# Patient Record
Sex: MALE | Race: WHITE | HISPANIC OR LATINO | URBAN - METROPOLITAN AREA
[De-identification: names, ages, dates, MRNs, and addresses within clinical notes are randomized per-mention and may not be internally consistent; named-entity substitution may affect disease eponyms.]

---

## 2023-02-27 ENCOUNTER — EVALUATION (OUTPATIENT)
Dept: PHYSICAL THERAPY | Facility: CLINIC | Age: 38
End: 2023-02-27

## 2023-02-27 DIAGNOSIS — M54.41 LOW BACK PAIN WITH RIGHT-SIDED SCIATICA, UNSPECIFIED BACK PAIN LATERALITY, UNSPECIFIED CHRONICITY: Primary | ICD-10-CM

## 2023-02-27 NOTE — LETTER
2023    Vivien Stauffer, [address] [address]    Patient: Mark Ogluin   YOB: 1985   Date of Visit: 2023     Encounter Diagnosis     ICD-10-CM    1  Low back pain with right-sided sciatica, unspecified back pain laterality, unspecified chronicity  M54 41           Dear Dr Adarsh Valentine: Thank you for your recent referral of Mark Olguin  Please review the attached evaluation summary from [facility] recent visit  Please verify that you agree with the plan of care by signing the attached order  If you have any questions or concerns, please do not hesitate to call  I sincerely appreciate the opportunity to share in the care of one of your patients and hope to have another opportunity to work with you in the near future  Sincerely,    Evi Justice, PT      Referring Provider:      I certify that I have read the below Plan of Care and certify the need for these services furnished under this plan of treatment while under my care  Vivien Stauffer MD  [address] [address]  Via Fax: [phone]          PT Evaluation     Today's date: 2023  Patient name: Mark Olguin  : 1985  MRN: [id number]  Referring provider: Farheen Velez MD  Dx:   Encounter Diagnosis     ICD-10-CM    1  Low back pain with right-sided sciatica, unspecified back pain laterality, unspecified chronicity  M54 41                      Assessment  Assessment details: Mark Olguin is a 44 y o  male referred with primary diagnosis of Low back pain with right-sided sciatica, unspecified back pain laterality, unspecified chronicity  (primary encounter diagnosis)   Patient presents with the following functional limitations: Pain with standing/walking > 10 minutes, sitting more than 20 minutes, bending over, and carrying heavier objects  Patient's symptoms are consistent with possible disc derangement    Pain in back and legs was abolished with repeated extension in prone position  Treatment to include: Manual therapy techniques, extremity/core strengthening, neuromuscular control exercises, instruction in a comprehensive HEP, and modalities as needed  They will benefit from skilled PT services to address the above functional deficits and to decrease pain to promote a return to their premorbid level of function  Impairments: activity intolerance, impaired physical strength, lacks appropriate home exercise program and pain with function  Functional limitations: Pain with standing/walking > 10 minutes, sitting more than 20 minutes, bending over, and carrying heavier objects  Understanding of Dx/Px/POC: good   Prognosis: good    Goals  STG (4 weeks)  1  Patient will report pain as a 4-5/10 at worst with normal activity  2  Patient will report 50% reduction in sleep disturbances related to pain  LTG (8 weeks)  1  Patient will report pain as a 2-3/10 at worst with normal activities  2  Patient will report the ability to ambulate community distances without increased pain  3  Patient will demonstrate the ability tto independently correct his posture   4  Patient will be independent and compliant with a HEP in order to maintain gains made with skilled PT services      Plan  Patient would benefit from: skilled physical therapy  Planned modality interventions: thermotherapy: hydrocollator packs and electrical stimulation/Russian stimulation  Planned therapy interventions: manual therapy, joint mobilization, abdominal trunk stabilization, neuromuscular re-education, patient education, postural training, strengthening, stretching, therapeutic activities, therapeutic exercise and home exercise program  Frequency: 2x week  Duration in weeks: 8  Plan of Care beginning date: 2/27/2023  Plan of Care expiration date: 4/24/2023  Treatment plan discussed with: patient        Subjective Evaluation    History of Present Illness  Mechanism of injury: Patient reports he has been having recurrent lower back pain for the past 10 years  About a month ago he was carrying a speaker and slipped on ice and twisted his back  For 3 days he couldn't move around very much  Had a cortisone injection in November with relief of symptoms for 30 days  Pain has returned to his baseline levels, but it effects him throughout the day  Referred now to outpatient PT services  Recurrent probem    Quality of life: good    Pain  Current pain ratin  At best pain rating: 3  At worst pain rating: 10  Location: right lower back, right buttock and right posterior thigh to knee  Relieving factors: heat  Progression: worsening    Social Support  Lives with: spouse and young children    Exercise history: Exercises 4x/week  Cardio, calisthenics and weight lifting  Diagnostic Tests  MRI studies: abnormal    FCE comments: (+) sleep disturbances 2x/night  Denies saddle anesthesia, weakness into bilateral LE  Feels better standing and walking  Patient states MRI shows he has a bulging disc in his lower back  One surgeon suggested he have surgery  Has previously done PT and chiropractor with symptom relief  He continues to do his HEP which he says helps  He has an inversion table at home which provides good symptom relief  Is unable to bench press and do sit ups due to pain  Treatments  Previous treatment: physical therapy, massage and chiropractic  Current treatment: massage and speech therapy  Patient Goals  Patient goals for therapy: decreased pain  Patient goal: Become independent with pain management  Objective     Concurrent Complaints  Positive for disturbed sleep  Negative for night pain, bladder dysfunction, bowel dysfunction, saddle (S4) numbness, history of cancer and history of trauma    Static Posture     Lumbar Spine   Flattened       Postural Observations  Seated posture: fair  Standing posture: fair  Correction of posture: makes symptoms better        Palpation   Left   No palpable tenderness to the erector spinae, lumbar paraspinals and quadratus lumborum  Right   No palpable tenderness to the erector spinae, lumbar paraspinals and quadratus lumborum  Neurological Testing     Sensation     Lumbar   Left   Intact: light touch    Right   Intact: light touch    Reflexes   Left   Patellar (L4): normal (2+)  Achilles (S1): normal (2+)    Right   Patellar (L4): normal (2+)  Achilles (S1): normal (2+)    Active Range of Motion     Lumbar   Flexion:  with pain  Extension:  WFL  Left lateral flexion:  WFL  Right lateral flexion:  WFL  Left rotation:  WFL  [facility]  [facility]    Joint Play     Hypomobile: L3, L4 and L5     Pain: L3, L4 and L5   Mechanical Assessment    Cervical      Thoracic      Lumbar    Standing extension: repeated movements  Pain location: centralized  Pain intensity: better  Pain level: decreased  Lying extension: repeated movements  Pain location: centralized  Pain intensity: better  Pain level: abolished    Strength/Myotome Testing     Lumbar   Left   Normal strength    Right   Normal strength    Tests     Lumbar   Negative SIJ compression, sacroiliac distraction and sacral spring   Left   Negative crossed SLR, passive SLR and quadrant  Right   Positive passive SLR and slump test    Negative quadrant  Left Hip   Negative ANITRA  Right Hip   Negative ANITRA and FADIR  Additional Tests Details  Increased neural tension right sciatic nerve    Ambulation     Ambulation: Stairs   Ascend stairs: independent  Pattern: reciprocal  Railings: without rails  Descend stairs: independent  Pattern: reciprocal  Railings: without rails    Observational Gait   Gait: within functional limits   Walking speed and stride length within functional limits               Precautions: None      Manuals 2/27            Right UPA mobs at facet joint Grade III JF            Prone lumbar rocking mobs NV            Left SL lumbar rotation mobs             Right sciatic nerve glides JF            Neuro Re-Ed             Pt education Posture    Centralization versus peripheralization            Webslide rows M,L             T-band Paloff Press             PPT with March L1 NV            Single leg bridge             SL t-band clamshell                          Ther Ex             UBE 1/2 F/R             Step HS stretch stacie             CALOS x10            Prone press-ups 2x10            Seated right nerve glides                                                    Ther Activity                                       Gait Training                                       Modalities

## 2023-02-27 NOTE — PROGRESS NOTES
PT Evaluation     Today's date: 2023  Patient name: Jose L Vidales  : 1985  MRN: [id number]  Referring provider: Vishal Pyle MD  Dx:   Encounter Diagnosis     ICD-10-CM    1  Low back pain with right-sided sciatica, unspecified back pain laterality, unspecified chronicity  M54 41                      Assessment  Assessment details: Jose L Vidales is a 44 y o  male referred with primary diagnosis of Low back pain with right-sided sciatica, unspecified back pain laterality, unspecified chronicity  (primary encounter diagnosis)   Patient presents with the following functional limitations: Pain with standing/walking > 10 minutes, sitting more than 20 minutes, bending over, and carrying heavier objects  Patient's symptoms are consistent with possible disc derangement  Pain in back and legs was abolished with repeated extension in prone position  Treatment to include: Manual therapy techniques, extremity/core strengthening, neuromuscular control exercises, instruction in a comprehensive HEP, and modalities as needed  They will benefit from skilled PT services to address the above functional deficits and to decrease pain to promote a return to their premorbid level of function  Impairments: activity intolerance, impaired physical strength, lacks appropriate home exercise program and pain with function  Functional limitations: Pain with standing/walking > 10 minutes, sitting more than 20 minutes, bending over, and carrying heavier objects  Understanding of Dx/Px/POC: good   Prognosis: good    Goals  STG (4 weeks)  1  Patient will report pain as a 4-5/10 at worst with normal activity  2  Patient will report 50% reduction in sleep disturbances related to pain  LTG (8 weeks)  1  Patient will report pain as a 2-3/10 at worst with normal activities  2  Patient will report the ability to ambulate community distances without increased pain  3   Patient will demonstrate the ability tto independently correct his posture   4  Patient will be independent and compliant with a HEP in order to maintain gains made with skilled PT services  Plan  Patient would benefit from: skilled physical therapy  Planned modality interventions: thermotherapy: hydrocollator packs and electrical stimulation/Russian stimulation  Planned therapy interventions: manual therapy, joint mobilization, abdominal trunk stabilization, neuromuscular re-education, patient education, postural training, strengthening, stretching, therapeutic activities, therapeutic exercise and home exercise program  Frequency: 2x week  Duration in weeks: 8  Plan of Care beginning date: 2023  Plan of Care expiration date: 2023  Treatment plan discussed with: patient        Subjective Evaluation    History of Present Illness  Mechanism of injury: Patient reports he has been having recurrent lower back pain for the past 10 years  About a month ago he was carrying a speaker and slipped on ice and twisted his back  For 3 days he couldn't move around very much  Had a cortisone injection in November with relief of symptoms for 30 days  Pain has returned to his baseline levels, but it effects him throughout the day  Referred now to outpatient PT services  Recurrent probem    Quality of life: good    Pain  Current pain ratin  At best pain rating: 3  At worst pain rating: 10  Location: right lower back, right buttock and right posterior thigh to knee  Relieving factors: heat  Progression: worsening    Social Support  Lives with: spouse and young children    Exercise history: Exercises 4x/week  Cardio, calisthenics and weight lifting  Diagnostic Tests  MRI studies: abnormal    FCE comments: (+) sleep disturbances 2x/night  Denies saddle anesthesia, weakness into bilateral LE  Feels better standing and walking  Patient states MRI shows he has a bulging disc in his lower back  One surgeon suggested he have surgery    Has previously done PT and chiropractor with symptom relief  He continues to do his HEP which he says helps  He has an inversion table at home which provides good symptom relief  Is unable to bench press and do sit ups due to pain  Treatments  Previous treatment: physical therapy, massage and chiropractic  Current treatment: massage and speech therapy  Patient Goals  Patient goals for therapy: decreased pain  Patient goal: Become independent with pain management  Objective     Concurrent Complaints  Positive for disturbed sleep  Negative for night pain, bladder dysfunction, bowel dysfunction, saddle (S4) numbness, history of cancer and history of trauma    Static Posture     Lumbar Spine   Flattened  Postural Observations  Seated posture: fair  Standing posture: fair  Correction of posture: makes symptoms better        Palpation   Left   No palpable tenderness to the erector spinae, lumbar paraspinals and quadratus lumborum  Right   No palpable tenderness to the erector spinae, lumbar paraspinals and quadratus lumborum       Neurological Testing     Sensation     Lumbar   Left   Intact: light touch    Right   Intact: light touch    Reflexes   Left   Patellar (L4): normal (2+)  Achilles (S1): normal (2+)    Right   Patellar (L4): normal (2+)  Achilles (S1): normal (2+)    Active Range of Motion     Lumbar   Flexion:  with pain  Extension:  WFL  Left lateral flexion:  WFL  Right lateral flexion:  WFL  Left rotation:  WFL  Right rotation:  [facility]    Joint Play     Hypomobile: L3, L4 and L5     Pain: L3, L4 and L5   Mechanical Assessment    Cervical      Thoracic      Lumbar    Standing extension: repeated movements  Pain location: centralized  Pain intensity: better  Pain level: decreased  Lying extension: repeated movements  Pain location: centralized  Pain intensity: better  Pain level: abolished    Strength/Myotome Testing     Lumbar   Left   Normal strength    Right   Normal strength    Tests     Lumbar   Negative SIJ compression, sacroiliac distraction and sacral spring   Left   Negative crossed SLR, passive SLR and quadrant  Right   Positive passive SLR and slump test    Negative quadrant  Left Hip   Negative ANITRA  Right Hip   Negative ANITRA and FADIR  Additional Tests Details  Increased neural tension right sciatic nerve    Ambulation     Ambulation: Stairs   Ascend stairs: independent  Pattern: reciprocal  Railings: without rails  Descend stairs: independent  Pattern: reciprocal  Railings: without rails    Observational Gait   Gait: within functional limits   Walking speed and stride length within functional limits  Precautions: None      Manuals 2/27            Right UPA mobs at facet joint Grade III JF            Prone lumbar rocking mobs NV            Left SL lumbar rotation mobs             Right sciatic nerve glides JF            Neuro Re-Ed             Pt education Posture    Centralization versus peripheralization            Webslide rows M,L             T-band Paloff Press             PPT with March L1 NV            Single leg bridge             SL t-band clamshell                          Ther Ex             UBE 1/2 F/R             Step HS stretch stacie             CALOS x10            Prone press-ups 2x10            Seated right nerve glides                                                    Ther Activity                                       Gait Training                                       Modalities

## 2023-03-02 ENCOUNTER — OFFICE VISIT (OUTPATIENT)
Dept: PHYSICAL THERAPY | Facility: CLINIC | Age: 38
End: 2023-03-02

## 2023-03-02 DIAGNOSIS — M54.41 LOW BACK PAIN WITH RIGHT-SIDED SCIATICA, UNSPECIFIED BACK PAIN LATERALITY, UNSPECIFIED CHRONICITY: Primary | ICD-10-CM

## 2023-03-02 NOTE — PROGRESS NOTES
Daily Note     Today's date: 3/2/2023  Patient name: Moises Fan  : 1985  MRN: [id number]  Referring provider: Андрей Hunter MD  Dx:   Encounter Diagnosis     ICD-10-CM    1  Low back pain with right-sided sciatica, unspecified back pain laterality, unspecified chronicity  M54 41           Start Time: 1030  Stop Time: 1115  Total time in clinic (min): 45 minutes    Subjective: pt noted upon arrival having some LB p! But noted he has been able to manage it  Pt noted also having some knee and ankle p!        Objective: See treatment diary below      Assessment: Continued with treatment session,  Required some verbal cues for proper technique as needed t/o session  + results with lock and sag prone press ups  Tolerated treatment well  Patient exhibited good technique with therapeutic exercises and would benefit from continued PT  S/P treatment session, pt noted feeling some muscle soreness  Education:   - HEP - education to continue with compliance  - DOMS - 24 to 48 hours of soreness s/p treatment may be noted  -Advised may use modalities as needed  MH: 10-20 min on and at least 60 min off prior to reapplication  Advised to not sleep with on as well  Plan: Continue per plan of care  Precautions: None      Manuals  3           Right UPA mobs at facet joint Grade III JF            Prone lumbar rocking mobs NV            Left SL lumbar rotation mobs             Right sciatic nerve glides JF resume NV missed  Neuro Re-Ed             Pt education Posture  Centralization versus peripheralization            Webslide rows M,L  BTB 2x 10            T-band Paloff Press  NV           PPT with March L1 NV Up up down down 2x 10            Single leg bridge  10x ea              SL t-band clamshell                          Ther Ex             UBE 1/2 F/R  5'/5' LV 2          Step HS stretch stacie             CALOS x10 reviewed            Prone press-ups 2x10 10x            Prone press up with lock and sag   10x            Seated right nerve glides                                                    Ther Activity                                       Gait Training                                       Modalities

## 2023-03-06 ENCOUNTER — OFFICE VISIT (OUTPATIENT)
Dept: PHYSICAL THERAPY | Facility: CLINIC | Age: 38
End: 2023-03-06

## 2023-03-06 DIAGNOSIS — M54.41 LOW BACK PAIN WITH RIGHT-SIDED SCIATICA, UNSPECIFIED BACK PAIN LATERALITY, UNSPECIFIED CHRONICITY: Primary | ICD-10-CM

## 2023-03-06 NOTE — PROGRESS NOTES
Daily Note     Today's date: 3/6/2023  Patient name: John Stephen  : 1985  MRN: [id number]  Referring provider: Annie Nicole MD  Dx:   Encounter Diagnosis     ICD-10-CM    1  Low back pain with right-sided sciatica, unspecified back pain laterality, unspecified chronicity  M54 41           Start Time: 1023  Stop Time: 1112  Total time in clinic (min): 49 minutes    Subjective: Pt noted having a little bit of soreness s/p treatment session  But noted overall feeling pretty good  Pt noted minimal p! 2/10 in his R side of his LB with radicular symptom to mid posterior thigh  Pt noted that he did a [employer] race this weekend  3k  "tough mudder"  Objective: See treatment diary below      Assessment: Continued with treatment session, Tolerated treatment well  Pt noted having some muscle burring with s/l clamshells  Patient exhibited good technique with therapeutic exercises and would benefit from continued PT  Pt noted that symptoms were centralized in lumbar s/p 1st set of 10 of prone lock and sag  S/P treatment session pt noted having minimal tightness in his lower back but no radicular symptoms present  Education:   - DOMS - 24 to 48 hours of muscle soreness s/p progressions added this visit  -HEP - compliance  Additional exercises added and received an updated TB for continuation as well  Plan: Continue per plan of care  Precautions: None      Manuals 2/27 3/2 3/6           Right UPA mobs at facet joint Grade III JF            Prone lumbar rocking mobs NV            Left SL lumbar rotation mobs             Right sciatic nerve glides JF resume NV missed  Neuro Re-Ed             Pt education Posture    Centralization versus peripheralization            Webslide rows M,L  BTB 2x 10  Black 3x 10  de NV         T-band [employer]  NV Black 10"x 10           PPT with March L1 NV Up up down down 2x 10            Single leg bridge 10x ea    2x 10           SL t-band clamshell   2x 10 GTB                        Ther Ex             UBE 1/2 F/R  5'/5' LV 7 5'/5' pt request            Step HS stretch stacie   30" x 3           CALOS x10 reviewed            Prone press-ups 2x10 10x            Prone press up with lock and sag   10x  2x 10           Seated right nerve glides                                                    Ther Activity                                       Gait Training                                       Modalities

## 2023-03-09 ENCOUNTER — OFFICE VISIT (OUTPATIENT)
Dept: PHYSICAL THERAPY | Facility: CLINIC | Age: 38
End: 2023-03-09

## 2023-03-09 DIAGNOSIS — M54.41 LOW BACK PAIN WITH RIGHT-SIDED SCIATICA, UNSPECIFIED BACK PAIN LATERALITY, UNSPECIFIED CHRONICITY: Primary | ICD-10-CM

## 2023-03-09 NOTE — PROGRESS NOTES
Daily Note     Today's date: 3/9/2023  Patient name: Vito Mera  : 1985  MRN: [id number]  Referring provider: Khang Cartagena MD  Dx:   Encounter Diagnosis     ICD-10-CM    1  Low back pain with right-sided sciatica, unspecified back pain laterality, unspecified chronicity  M54 41                      Subjective: Patient states he has been feeling very good and is not having any back pain  Has muscle soreness which he attributes to working out and doing his HEP, but he is not having pain  Objective: See treatment diary below      Assessment: Tolerated treatment well  Patient would benefit from continued PT  Good technique with TE today  Fatigued after session  Core strength improved  Progressing very well  Plan: Continue per plan of care  Precautions: None      Manuals 2/27 3/2 3/6  3/9         Right UPA mobs at facet joint Grade III JF            Prone lumbar rocking mobs NV            Left SL lumbar rotation mobs             Right sciatic nerve glides JF resume NV missed  Neuro Re-Ed             Pt education Posture  Centralization versus peripheralization            Webslide rows M,L  BTB 2x 10  Black 3x 10  [id number] 3x10         Rochelle chops    25# 3x10         [place] Reverse chops    15# 3x10         T-band Paloff Press  NV Black 10"x 10           PPT with March L1 NV Up up down down 2x 10            T-ball Bridge    5"x30         Single leg bridge  10x ea    2x 10           SL t-band clamshell   2x 10 GTB           Prone planks    3x30"         Side planks    x90"         Ther Ex             UBE  F/R  5'/5' LV 7 5'/5' pt request   LV 7 5'/5' pt request           Step HS stretch stacie   30" x 3           CALOS x10 reviewed            Prone press-ups 2x10 10x            Prone press up with lock and sag   10x  2x 10           Seated right nerve glides                                                    Ther Activity Gait Training                                       Modalities

## 2023-03-13 ENCOUNTER — OFFICE VISIT (OUTPATIENT)
Dept: PHYSICAL THERAPY | Facility: CLINIC | Age: 38
End: 2023-03-13

## 2023-03-13 DIAGNOSIS — M54.41 LOW BACK PAIN WITH RIGHT-SIDED SCIATICA, UNSPECIFIED BACK PAIN LATERALITY, UNSPECIFIED CHRONICITY: Primary | ICD-10-CM

## 2023-03-13 NOTE — PROGRESS NOTES
Daily Note     Today's date: 3/13/2023  Patient name: Bernie Kidd  : 1985  MRN: [id number]  Referring provider: Robbi Mcginnis MD  Dx:   Encounter Diagnosis     ICD-10-CM    1  Low back pain with right-sided sciatica, unspecified back pain laterality, unspecified chronicity  M54 41                      Subjective: Back continues to feel better  Was at a wrestling tournament this weekend and had to get up and move around when he started to feel sore  Objective: See treatment diary below      Assessment: Tolerated treatment well  Patient would benefit from continued PT  Patient states his back is feeling good  No complaints of back pain  Had some soreness in ankle and knee with sidestepping today  Plan: Continue per plan of care  Precautions: None      Manuals 2/27 3/2 3/6  3/9 3/13        Right UPA mobs at facet joint Grade III JF            Prone lumbar rocking mobs NV            Left SL lumbar rotation mobs             Right sciatic nerve glides JF resume NV missed  Neuro Re-Ed             Pt education Posture  Centralization versus peripheralization            Webslide rows M,L  BTB 2x 10  Black 3x 10  [id number]# 3x10 cplqvx86# 3x10        [place] chops    25# 3x10 25# 3x10        Rochelle Reverse chops    15# 3x10 15# 3x10        Rochelle Multifidus sidestepping     10#x10 ea        T-band Paloff Press  NV Black 10"x 10           PPT with March L1 NV Up up down down 2x 10            T-ball Bridge    5"x30 5"x30        Single leg bridge  10x ea    2x 10           SL t-band clamshell   2x 10 GTB           Prone planks    3x30"         Side planks    x90"         Ther Ex             UBE  F/R  5'/5' LV 7 5'/5' pt request   LV 7 5'/5' pt request   LV 5 5'/5'         Step HS stretch stacie   30" x 3           CALOS x10 reviewed            Prone press-ups 2x10 10x            Prone press up with lock and sag   10x  2x 10           Seated right nerve glides                                                    Ther Activity                                       Gait Training                                       Modalities

## 2023-03-15 NOTE — PROGRESS NOTES
"Daily Note     Today's date: 3/15/2023  Patient name: Devendra Stuart  : 1985  MRN: [id number]  Referring provider: Jamir Mackey MD  Dx:   Encounter Diagnosis     ICD-10-CM    1  Low back pain with right-sided sciatica, unspecified back pain laterality, unspecified chronicity  M54 41                      Subjective: ***      Objective: See treatment diary below      Assessment: Tolerated treatment {Tolerated treatment :4647898948}  Patient {assessment:3178259300}      Plan: {PLAN:7369320091}     Precautions: None      Manuals 2/27 3/2 3/6  3/9 3/13        Right UPA mobs at facet joint Grade III JF            Prone lumbar rocking mobs NV            Left SL lumbar rotation mobs             Right sciatic nerve glides JF resume NV missed  Neuro Re-Ed             Pt education Posture  Centralization versus peripheralization            Webslide rows M,L  BTB 2x 10  Black 3x 10  [id number]# 3x10 [id number]# 3x10        Rochelle chops    25# 3x10 25# 3x10        Rochelle Reverse chops    15# 3x10 15# 3x10        Rochelle Multifidus sidestepping     10#x10 ea        T-band Paloff Press  NV Black 10\"x 10           PPT with March L1 NV Up up down down 2x 10            T-ball Bridge    5\"x30 5\"x30        Single leg bridge  10x ea    2x 10           SL t-band clamshell   2x 10 GTB           Prone planks    3x30\"         Side planks    x90\"         Ther Ex             UBE / F/R  5'/5' LV 7 5'/5' pt request   LV 7 5'/5' pt request   LV 5 5'/5'         Step HS stretch stcaie   30\" x 3           CALOS x10 reviewed            Prone press-ups 2x10 10x            Prone press up with lock and sag   10x  2x 10           Seated right nerve glides                                                    Ther Activity                                       Gait Training                                       Modalities                                            "

## 2023-03-16 ENCOUNTER — APPOINTMENT (OUTPATIENT)
Dept: PHYSICAL THERAPY | Facility: CLINIC | Age: 38
End: 2023-03-16

## 2023-03-16 DIAGNOSIS — M54.41 LOW BACK PAIN WITH RIGHT-SIDED SCIATICA, UNSPECIFIED BACK PAIN LATERALITY, UNSPECIFIED CHRONICITY: Primary | ICD-10-CM

## 2023-03-20 ENCOUNTER — OFFICE VISIT (OUTPATIENT)
Dept: PHYSICAL THERAPY | Facility: CLINIC | Age: 38
End: 2023-03-20

## 2023-03-20 DIAGNOSIS — M54.41 LOW BACK PAIN WITH RIGHT-SIDED SCIATICA, UNSPECIFIED BACK PAIN LATERALITY, UNSPECIFIED CHRONICITY: Primary | ICD-10-CM

## 2023-03-20 NOTE — PROGRESS NOTES
Daily Note     Today's date: 3/20/2023  Patient name: Lin Watt  : 1985  MRN: [id number]  Referring provider: Smita Meadows MD  Dx:   Encounter Diagnosis     ICD-10-CM    1  Low back pain with right-sided sciatica, unspecified back pain laterality, unspecified chronicity  M54 41                      Subjective: Patient states he was back and forth to [place] for work this weekend and is having pain in his lower back  Trying doing his exercises as he had a chance  Objective: See treatment diary below      Assessment: Tolerated treatment well  Patient would benefit from continued PT  Pain in lower back was abolished with press-ups with overpressure today  Held other treatment due to patient feeling well and wanting to go to work  Plan: Continue per plan of care  Precautions: None      Manuals 2/27 3/2 3/6  3/9 3/13 3/20       Right UPA mobs at facet joint Grade III JF     Grade III JF       Prone lumbar rocking mobs NV     JF       Left SL lumbar rotation mobs             Right sciatic nerve glides JF resume NV missed  Neuro Re-Ed             Pt education Posture  Centralization versus peripheralization            Webslide rows M,L  BTB 2x 10  Black 3x 10  [id number]# 3x10 [id number]# 3x10        Rochelle chops    25# 3x10 25# 3x10        [place] Reverse chops    15# 3x10 15# 3x10        Rochelle Multifidus sidestepping     10#x10 ea        T-band Paloff Press  NV Black 10"x 10           PPT with March L1 NV Up up down down 2x 10            T-ball Bridge    5"x30 5"x30        Single leg bridge  10x ea    2x 10           SL t-band clamshell   2x 10 GTB           Prone planks    3x30"         Side planks    x90"         Ther Ex             UBE /2 F/R  5'/5' LV 7 5'/5' pt request   LV 7 5'/5' pt request   LV 5 5'/5'         Nustep seat 10 with UE      L8x10'       Step HS stretch stacie   30" x 3           CALOS x10 reviewed            Prone press-ups 2x10 10x     PT OP 5x10       Prone press up with lock and sag   10x  2x 10           Seated right nerve glides                                                    Ther Activity                                       Gait Training                                       Modalities

## 2023-03-23 ENCOUNTER — OFFICE VISIT (OUTPATIENT)
Dept: PHYSICAL THERAPY | Facility: CLINIC | Age: 38
End: 2023-03-23

## 2023-03-23 DIAGNOSIS — M54.41 LOW BACK PAIN WITH RIGHT-SIDED SCIATICA, UNSPECIFIED BACK PAIN LATERALITY, UNSPECIFIED CHRONICITY: Primary | ICD-10-CM

## 2023-03-23 NOTE — PROGRESS NOTES
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      Plan: Continue per plan of care  Precautions: None      Manuals 2/27 3/2 3/6  3/9 3/13 3/20 3/23      Right UPA mobs at facet joint Grade III JF     Grade III JF       Prone lumbar rocking mobs NV     JF       Left SL lumbar rotation mobs             Right sciatic nerve glides JF resume NV missed  Neuro Re-Ed             Pt education Posture    Centralization versus peripheralization            Webslide rows M,L  BTB 2x 10  Black 3x 10  [id number]# 3x10 [id number]# 3x10  Rochelle 25# L 30# M  3x 10       [place] chops    25# 3x10 25# 3x10  15# 3x 10       Rochelle Reverse chops    15# 3x10 15# 3x10  15# 3x 10       Rochelle Multifidus sidestepping     10#x10 ea        T-band Paloff Press  NV Black 10"x 10     kesier 12# 10x e a       PPT with March L1 NV Up up down down 2x 10            T-ball Bridge 5"x30 5"x30        Single leg bridge  10x ea  2x 10           SL t-band clamshell   2x 10 GTB           Prone planks    3x30"         Side planks    x90"         Ther Ex             UBE 1/2 F/R  5'/5' LV 7 5'/5' pt request   LV 7 5'/5' pt request   LV 5 5'/5'   L5 - L8 10 min       Nustep seat 10 with UE      L8x10'       Step HS stretch stacie   30" x 3           CALOS x10 reviewed            Prone press-ups 2x10 10x     PT OP 5x10       Prone press up with lock and sag   10x  2x 10           Seated right nerve glides                                                    Ther Activity                                       Gait Training                                       Modalities                                              1 on 1 time for 19 minutes on 3/23/23 all other exercises completed with indirect supervision

## 2023-03-30 ENCOUNTER — OFFICE VISIT (OUTPATIENT)
Dept: PHYSICAL THERAPY | Facility: CLINIC | Age: 38
End: 2023-03-30

## 2023-03-30 DIAGNOSIS — M54.41 LOW BACK PAIN WITH RIGHT-SIDED SCIATICA, UNSPECIFIED BACK PAIN LATERALITY, UNSPECIFIED CHRONICITY: Primary | ICD-10-CM

## 2023-03-30 NOTE — PROGRESS NOTES
"Daily Note     Today's date: 3/30/2023  Patient name: Marlin Melgoza  : 1985  MRN: [id number]  Referring provider: Nydia Valdes MD  Dx:   Encounter Diagnosis     ICD-10-CM    1  Low back pain with right-sided sciatica, unspecified back pain laterality, unspecified chronicity  M54 41                      Subjective: Patient states back is feeling pretty good  Had some soreness due to flying and sitting in the airport  Also pitched batting practice yesterday at practice and his back was sore  Objective: See treatment diary below      Assessment: Tolerated treatment well  Patient would benefit from continued PT  Felt very good after session  Manual techniques abolished pain  Plan: Continue per plan of care  Precautions: None      Manuals 2/27 3/2 3/6  3/9 3/13 3/20 3/23 3/30     Right UPA mobs at facet joint Grade III JF     Grade III JF       Prone lumbar rocking mobs NV     JF       Left SL lumbar rotation mobs             Right sciatic nerve glides JF resume NV missed  CPA mobs L3-5        Grade III-IV JF                               Neuro Re-Ed             Pt education Posture  Centralization versus peripheralization            Webslide rows M,L  BTB 2x 10  Black 3x 10  [id number]# 3x10 [id number]# 3x10  [place] 25# L 30# M  3x 10  Rochelle 25# L 30# M  3x 10      Rochelle chops    25# 3x10 25# 3x10  15# 3x 10  15# 3x 10      [place] Reverse chops    15# 3x10 15# 3x10  15# 3x 10  15# 3x 10      [place] Multifidus sidestepping     10#x10 ea        T-band Paloff Press  NV Black 10\"x 10     kesier 12# 10x e a       PPT with March L1 NV Up up down down 2x 10            T-ball Bridge    5\"x30 5\"x30        Single leg bridge  10x ea    2x 10           SL t-band clamshell   2x 10 GTB           Prone planks    3x30\"         Side planks    x90\"         Ther Ex             UBE  F/R  5'/5' LV 7 5'/5' pt request   LV 7 5'/5' pt request   LV 5 5'/5'   L5 - L8 10 min  LV 7 5'/5' pt requ   " "  Nustep seat 10 with UE      L8x10'  L8x10'     Step HS stretch stacie   30\" x 3           CALOS x10 reviewed            Prone press-ups 2x10 10x     PT OP 5x10  5x10     Prone press up with lock and sag   10x  2x 10           Seated right nerve glides                                                    Ther Activity                                       Gait Training                                       Modalities                                            "

## 2023-04-03 ENCOUNTER — EVALUATION (OUTPATIENT)
Dept: PHYSICAL THERAPY | Facility: CLINIC | Age: 38
End: 2023-04-03

## 2023-04-03 DIAGNOSIS — M54.41 LOW BACK PAIN WITH RIGHT-SIDED SCIATICA, UNSPECIFIED BACK PAIN LATERALITY, UNSPECIFIED CHRONICITY: Primary | ICD-10-CM

## 2023-04-03 NOTE — LETTER
April 3, 2023    Kimberly Morse, [address] [address]    Patient: Dali Wise   YOB: 1985   Date of Visit: 4/3/2023     Encounter Diagnosis     ICD-10-CM    1  Low back pain with right-sided sciatica, unspecified back pain laterality, unspecified chronicity  M54 41           Dear Dr Vanessa Rabago: Thank you for your recent referral of Dali Wise  Please review the attached evaluation summary from [address] recent visit  Please verify that you agree with the plan of care by signing the attached order  If you have any questions or concerns, please do not hesitate to call  I sincerely appreciate the opportunity to share in the care of one of your patients and hope to have another opportunity to work with you in the near future  Sincerely,    Jonny Faye, PT      Referring Provider:      I certify that I have read the below Plan of Care and certify the need for these services furnished under this plan of treatment while under my care  Kimberly Morse MD  [address] [address]  Via Fax: [phone]          PT Re-Evaluation     Today's date: 4/3/2023  Patient name: Dali Wise  : 1985  MRN: [id number]  Referring provider: Livia George MD  Dx:   Encounter Diagnosis     ICD-10-CM    1  Low back pain with right-sided sciatica, unspecified back pain laterality, unspecified chronicity  M54 41                      Assessment  Assessment details: Patient reports feeling 50% improved since starting PT services to date  He notes an overall decrease in the intensity of his pain and improvements in lumbar ROM and core strength  He can stand and walk for an hour before increased back pain and is able to sit longer without pain  He is now able to sleep without disturbances related to pain  He had some pain with right lumbar rotation and lumbar flexion today    Pain-free ROM was restored with bilateral sciatic nerve glides and right L5 UPA mobs  He was instructed in sciatic nerve glides both in sitting and supine  Patient will benefit from continuation of PT services in order to further alleviate his pain and improve tolerance to functional activity  Impairments: activity intolerance, impaired physical strength, lacks appropriate home exercise program and pain with function  Functional limitations: Pain with standing/walking > 10 minutes, sitting more than 20 minutes, bending over, and carrying heavier objects  Understanding of Dx/Px/POC: good   Prognosis: good    Goals  STG (4 weeks)  1  Patient will report pain as a 4-5/10 at worst with normal activity - not met  2  Patient will report 50% reduction in sleep disturbances related to pain - met  LTG (8 weeks)  1  Patient will report pain as a 2-3/10 at worst with normal activities - not met  2  Patient will report the ability to ambulate community distances without increased pain - met  3  Patient will demonstrate the ability tto independently correct his posture - partially met  4  Patient will be independent and compliant with a HEP in order to maintain gains made with skilled PT services  - not met    Plan  Patient would benefit from: skilled physical therapy  Planned modality interventions: thermotherapy: hydrocollator packs and electrical stimulation/Russian stimulation  Planned therapy interventions: manual therapy, joint mobilization, abdominal trunk stabilization, neuromuscular re-education, patient education, postural training, strengthening, stretching, therapeutic activities, therapeutic exercise and home exercise program  Frequency: 2x week  Duration in weeks: 4  Plan of Care beginning date: 2/27/2023  Plan of Care expiration date: 5/1/2023  Treatment plan discussed with: patient        Subjective Evaluation    History of Present Illness  Mechanism of injury: Patient reports he is feeling 50% improved since starting PT services    He notices improved mobility and strength with activity, as well as, decreased complaints of pain  Feels very good when he is warm and moving   (-) sleep disturbances  Recurrent probem    Quality of life: good    Pain  Current pain ratin  At best pain ratin  At worst pain ratin  Location: Right lower back along spine  Relieving factors: heat  Progression: worsening    Social Support  Lives with: spouse and young children    Exercise history: Exercises 4x/week  Cardio, calisthenics and weight lifting  Diagnostic Tests  MRI studies: abnormal    FCE comments: Feels better standing and walking  Treatments  Previous treatment: physical therapy, massage and chiropractic  Current treatment: massage and speech therapy  Patient Goals  Patient goals for therapy: decreased pain  Patient goal: Become independent with pain management  Objective     Concurrent Complaints  Negative for night pain, disturbed sleep, bladder dysfunction, bowel dysfunction, saddle (S4) numbness, history of cancer and history of trauma    Static Posture     Lumbar Spine   Flattened  Postural Observations  Seated posture: fair  Standing posture: fair  Correction of posture: makes symptoms better        Palpation   Left   No palpable tenderness to the erector spinae, lumbar paraspinals and quadratus lumborum  Right   No palpable tenderness to the erector spinae, lumbar paraspinals and quadratus lumborum       Neurological Testing     Sensation     Lumbar   Left   Intact: light touch    Right   Intact: light touch    Reflexes   Left   Patellar (L4): normal (2+)  Achilles (S1): normal (2+)    Right   Patellar (L4): normal (2+)  Achilles (S1): normal (2+)    Active Range of Motion     Lumbar   Flexion:  with pain Restriction level: minimal  Extension:  WFL  Left lateral flexion:  WFL  Right lateral flexion:  WFL  Left rotation:  WFL  Right rotation:  [facility]    Joint Play     Hypomobile: L5     Pain: L5   Mechanical "Assessment    Cervical      Thoracic      Lumbar    Standing extension: repeated movements  Pain location: centralized  Pain intensity: better  Pain level: decreased  Lying extension: repeated movements  Pain location: centralized  Pain intensity: better  Pain level: abolished    Strength/Myotome Testing     Lumbar   Left   Normal strength    Right   Normal strength    Tests     Lumbar   Negative SIJ compression, sacroiliac distraction and sacral spring   Left   Positive slump test    Negative crossed SLR, passive SLR and quadrant  Right   Positive slump test    Negative passive SLR and quadrant  Left Hip   Negative ANITRA  Right Hip   Negative ANITRA and FADIR  Additional Tests Details  Increased neural tension bilateral sciatic nerve    Ambulation     Ambulation: Stairs   Ascend stairs: independent  Pattern: reciprocal  Railings: without rails  Descend stairs: independent  Pattern: reciprocal  Railings: without rails    Observational Gait   Gait: within functional limits   Walking speed and stride length within functional limits  Precautions: None      Manuals 2/27 3/2 3/6  3/9 3/13 3/20 3/23 3/30 4/3    Right UPA mobs at facet joint Grade III JF     Grade III JF   L5 Grade IV JF    Prone lumbar rocking mobs NV     JF       Left SL lumbar rotation mobs             Right sciatic nerve glides JF resume NV missed  JF    CPA mobs L3-5        Grade III-IV JF                               Neuro Re-Ed             Pt education Posture    Centralization versus peripheralization        Sciatic nerve tension    Webslide rows M,L  BTB 2x 10  Black 3x 10  [id number]# 3x10 [id number]# 3x10  Rochelle 25# L 30# M  3x 10  [place] 25# L 30# M  3x 10      [place] chops    25# 3x10 25# 3x10  15# 3x 10  15# 3x 10      [place] Reverse chops    15# 3x10 15# 3x10  15# 3x 10  15# 3x 10      Rochelle Multifidus sidestepping     10#x10 ea        T-band Paloff Press  NV Black 10\"x 10     kesier 12# 10x e a       PPT " "with March L1 NV Up up down down 2x 10            T-ball Bridge    5\"x30 5\"x30        Single leg bridge  10x ea    2x 10           SL t-band clamshell   2x 10 GTB           Prone planks    3x30\"         Side planks    x90\"         Ther Ex             UBE 1/2 F/R  5'/5' LV 7 5'/5' pt request   LV 7 5'/5' pt request   LV 5 5'/5'   L5 - L8 10 min  LV 7 5'/5' pt requ     Nustep seat 10 with UE      L8x10'  L8x10' L8x10'    Step HS stretch [place]   30\" x 3           CALOS x10 reviewed            Prone press-ups 2x10 10x     PT OP 5x10  5x10     Prone press up with lock and sag   10x  2x 10           Seated right nerve glides         [place] x20    Supine sciatic nerve tensioners         [place] x 20                              Ther Activity                                       Gait Training                                       Modalities                                                              "

## 2023-04-03 NOTE — PROGRESS NOTES
PT Re-Evaluation     Today's date: 4/3/2023  Patient name: Paras Peterson  : 1985  MRN: [id number]  Referring provider: Cara Huang MD  Dx:   Encounter Diagnosis     ICD-10-CM    1  Low back pain with right-sided sciatica, unspecified back pain laterality, unspecified chronicity  M54 41                      Assessment  Assessment details: Patient reports feeling 50% improved since starting PT services to date  He notes an overall decrease in the intensity of his pain and improvements in lumbar ROM and core strength  He can stand and walk for an hour before increased back pain and is able to sit longer without pain  He is now able to sleep without disturbances related to pain  He had some pain with right lumbar rotation and lumbar flexion today  Pain-free ROM was restored with bilateral sciatic nerve glides and right L5 UPA mobs  He was instructed in sciatic nerve glides both in sitting and supine  Patient will benefit from continuation of PT services in order to further alleviate his pain and improve tolerance to functional activity  Impairments: activity intolerance, impaired physical strength, lacks appropriate home exercise program and pain with function  Functional limitations: Pain with standing/walking > 10 minutes, sitting more than 20 minutes, bending over, and carrying heavier objects  Understanding of Dx/Px/POC: good   Prognosis: good    Goals  STG (4 weeks)  1  Patient will report pain as a 4-5/10 at worst with normal activity - not met  2  Patient will report 50% reduction in sleep disturbances related to pain - met  LTG (8 weeks)  1  Patient will report pain as a 2-3/10 at worst with normal activities - not met  2  Patient will report the ability to ambulate community distances without increased pain - met  3  Patient will demonstrate the ability tto independently correct his posture - partially met  4   Patient will be independent and compliant with a HEP in order to maintain gains made with skilled PT services  - not met    Plan  Patient would benefit from: skilled physical therapy  Planned modality interventions: thermotherapy: hydrocollator packs and electrical stimulation/Russian stimulation  Planned therapy interventions: manual therapy, joint mobilization, abdominal trunk stabilization, neuromuscular re-education, patient education, postural training, strengthening, stretching, therapeutic activities, therapeutic exercise and home exercise program  Frequency: 2x week  Duration in weeks: 4  Plan of Care beginning date: 2023  Plan of Care expiration date: 2023  Treatment plan discussed with: patient        Subjective Evaluation    History of Present Illness  Mechanism of injury: Patient reports he is feeling 50% improved since starting PT services  He notices improved mobility and strength with activity, as well as, decreased complaints of pain  Feels very good when he is warm and moving   (-) sleep disturbances  Recurrent probem    Quality of life: good    Pain  Current pain ratin  At best pain ratin  At worst pain ratin  Location: Right lower back along spine  Relieving factors: heat  Progression: worsening    Social Support  Lives with: spouse and young children    Exercise history: Exercises 4x/week  Cardio, calisthenics and weight lifting  Diagnostic Tests  MRI studies: abnormal    FCE comments: Feels better standing and walking  Treatments  Previous treatment: physical therapy, massage and chiropractic  Current treatment: massage and speech therapy  Patient Goals  Patient goals for therapy: decreased pain  Patient goal: Become independent with pain management  Objective     Concurrent Complaints  Negative for night pain, disturbed sleep, bladder dysfunction, bowel dysfunction, saddle (S4) numbness, history of cancer and history of trauma    Static Posture     Lumbar Spine   Flattened       Postural Observations  Seated posture: fair  Standing posture: fair  Correction of posture: makes symptoms better        Palpation   Left   No palpable tenderness to the erector spinae, lumbar paraspinals and quadratus lumborum  Right   No palpable tenderness to the erector spinae, lumbar paraspinals and quadratus lumborum  Neurological Testing     Sensation     Lumbar   Left   Intact: light touch    Right   Intact: light touch    Reflexes   Left   Patellar (L4): normal (2+)  Achilles (S1): normal (2+)    Right   Patellar (L4): normal (2+)  Achilles (S1): normal (2+)    Active Range of Motion     Lumbar   Flexion:  with pain Restriction level: minimal  Extension:  WFL  Left lateral flexion:  WFL  Right lateral flexion:  WFL  Left rotation:  WFL  Right rotation:  [facility]    Joint Play     Hypomobile: L5     Pain: L5   Mechanical Assessment    Cervical      Thoracic      Lumbar    Standing extension: repeated movements  Pain location: centralized  Pain intensity: better  Pain level: decreased  Lying extension: repeated movements  Pain location: centralized  Pain intensity: better  Pain level: abolished    Strength/Myotome Testing     Lumbar   Left   Normal strength    Right   Normal strength    Tests     Lumbar   Negative SIJ compression, sacroiliac distraction and sacral spring   Left   Positive slump test    Negative crossed SLR, passive SLR and quadrant  Right   Positive slump test    Negative passive SLR and quadrant  Left Hip   Negative ANITRA  Right Hip   Negative ANITRA and FADIR  Additional Tests Details  Increased neural tension bilateral sciatic nerve    Ambulation     Ambulation: Stairs   Ascend stairs: independent  Pattern: reciprocal  Railings: without rails  Descend stairs: independent  Pattern: reciprocal  Railings: without rails    Observational Gait   Gait: within functional limits   Walking speed and stride length within functional limits                Precautions: None      Manuals 2/27 3/2 3/6  3/9 3/13 3/20 3/23 "3/30 4/3    Right UPA mobs at facet joint Grade III JF     Grade III JF   L5 Grade IV JF    Prone lumbar rocking mobs NV     JF       Left SL lumbar rotation mobs             Right sciatic nerve glides JF resume NV missed  JF    CPA mobs L3-5        Grade III-IV JF                               Neuro Re-Ed             Pt education Posture  Centralization versus peripheralization        Sciatic nerve tension    Webslide rows M,L  BTB 2x 10  Black 3x 10  [id number]# 3x10 [id number]# 3x10  Rochelle 25# L 30# M  3x 10  [place] 25# L 30# M  3x 10      Rochelle chops    25# 3x10 25# 3x10  15# 3x 10  15# 3x 10      Rochelle Reverse chops    15# 3x10 15# 3x10  15# 3x 10  15# 3x 10      Rochelle Multifidus sidestepping     10#x10 ea        T-band Paloff Press  NV Black 10\"x 10     kesier 12# 10x e a       PPT with March L1 NV Up up down down 2x 10            T-ball Bridge    5\"x30 5\"x30        Single leg bridge  10x ea    2x 10           SL t-band clamshell   2x 10 GTB           Prone planks    3x30\"         Side planks    x90\"         Ther Ex             UBE 1/2 F/R  5'/5' LV 7 5'/5' pt request   LV 7 5'/5' pt request   LV 5 5'/5'   L5 - L8 10 min  LV 7 5'/5' pt requ     Nustep seat 10 with UE      L8x10'  L8x10' L8x10'    Step HS stretch anderson   30\" x 3           CALOS x10 reviewed            Prone press-ups 2x10 10x     PT OP 5x10  5x10     Prone press up with lock and sag   10x  2x 10           Seated right nerve glides         Anderson x20    Supine sciatic nerve tensioners         Anderson x 20                              Ther Activity                                       Gait Training                                       Modalities                                               "

## 2023-04-06 ENCOUNTER — OFFICE VISIT (OUTPATIENT)
Dept: PHYSICAL THERAPY | Facility: CLINIC | Age: 38
End: 2023-04-06

## 2023-04-06 DIAGNOSIS — M54.41 LOW BACK PAIN WITH RIGHT-SIDED SCIATICA, UNSPECIFIED BACK PAIN LATERALITY, UNSPECIFIED CHRONICITY: Primary | ICD-10-CM

## 2023-04-06 NOTE — PROGRESS NOTES
"Daily Note     Today's date: 2023  Patient name: Francisco Jenkins  : 1985  MRN: [id number]  Referring provider: Neill Klinefelter, MD  Dx:   Encounter Diagnosis     ICD-10-CM    1  Low back pain with right-sided sciatica, unspecified back pain laterality, unspecified chronicity  M54 41                      Subjective: Pt reports that he is feeling pretty good today with minimal pain in his low back 2/10  Notes that 2 days ago he was in worse pain but has been feeling better the last couple of days noting that this is the best that he has felt  Objective: See treatment diary below      Assessment: Tolerated treatment well  Pt began on the nu step to promote muscular endurance and strengthening  Pt demonstrated increased neural tension R>L but does respond well to the manual and self nerve glides  Patient demonstrated fatigue post treatment, exhibited good technique with therapeutic exercises and would benefit from continued PT      Plan: Continue per plan of care  Precautions: None      Manuals 2/27 3/2 3/6  3/9 3/13 3/20 3/23 3/30 4/3 4/6   Right UPA mobs at facet joint Grade III JF     Grade III JF   L5 Grade IV JF    Prone lumbar rocking mobs NV     JF       Left SL lumbar rotation mobs             Right sciatic nerve glides JF resume NV missed  JF MM   CPA mobs L3-5        Grade III-IV JF                               Neuro Re-Ed             Pt education Posture    Centralization versus peripheralization        Sciatic nerve tension    Webslide rows M,L  BTB 2x 10  Black 3x 10  [id number]# 3x10 [id number]# 3x10  Rochelle 25# L 30# M  3x 10  Rochelle 25# L 30# M  3x 10   [id number]# 3x10   Rochelle chops    25# 3x10 25# 3x10  15# 3x 10  15# 3x 10   15# 3x 10    Rochelle Reverse chops    15# 3x10 15# 3x10  15# 3x 10  15# 3x 10   15# 3x 10    [place] Multifidus sidestepping     10#x10 ea        T-band Paloff Press  NV Black 10\"x 10     kesier 12# 10x e a    kesier 12# 15x e a   PPT with March L1 NV Up up " "down down 2x 10            T-ball Bridge    5\"x30 5\"x30        Single leg bridge  10x ea    2x 10           SL t-band clamshell   2x 10 GTB           Prone planks    3x30\"         Side planks    x90\"         Ther Ex             UBE 1/2 F/R  5'/5' LV 7 5'/5' pt request   LV 7 5'/5' pt request   LV 5 5'/5'   L5 - L8 10 min  LV 7 5'/5' pt requ     Nustep seat 10 with UE      L8x10'  L8x10' L8x10' L8 10'   Step HS stretch anderson   30\" x 3           CALOS x10 reviewed            Prone press-ups 2x10 10x     PT OP 5x10  5x10     Prone press up with lock and sag   10x  2x 10           Seated right nerve glides         Anderson x20 Anderson x20   Supine sciatic nerve tensioners         Anderson x 20                              Ther Activity                                       Gait Training                                       Modalities                                            "

## 2023-04-20 ENCOUNTER — APPOINTMENT (OUTPATIENT)
Dept: PHYSICAL THERAPY | Facility: CLINIC | Age: 38
End: 2023-04-20

## 2023-04-24 ENCOUNTER — APPOINTMENT (OUTPATIENT)
Dept: PHYSICAL THERAPY | Facility: CLINIC | Age: 38
End: 2023-04-24

## 2023-04-27 ENCOUNTER — TELEPHONE (OUTPATIENT)
Dept: PHYSICAL THERAPY | Facility: CLINIC | Age: 38
End: 2023-04-27

## 2023-04-27 ENCOUNTER — APPOINTMENT (OUTPATIENT)
Dept: PHYSICAL THERAPY | Facility: CLINIC | Age: 38
End: 2023-04-27

## 2023-04-27 NOTE — TELEPHONE ENCOUNTER
Called patient on 4/27/23 at 10:35am due to NS to his PT appointment time for 10:15am      Pt advised if he would like to R S within this week to call PT office  Educated pt to please confirm his next appointment scheduled on Thursday 5/4/23 at 9:30am  Pt educated if he does not confirm the appointment within 24 hours  If patient does not confirm his appointment 24 hours before it will be removed due to the attendance policy  Provided call back number of [phone]